# Patient Record
(demographics unavailable — no encounter records)

---

## 2025-02-07 NOTE — DISCUSSION/SUMMARY
[de-identified] : The patient presents today for initial consultation evaluation regarding right shoulder pain ongoing for about 2 weeks or so now.  His physical exam and x-rays reveals evidence of osteoarthritis about the right shoulder.  I am concerned that he has rotator cuff tear as well due to significant weakness and limitation of active motion.  I recommend an MRI to rule out rotator cuff tear and to evaluate the extent of the osteoarthritis.  He will likely require shoulder placement surgery in the future.  I will see him back after the MRI for follow-up and review.  In addition we started him a course of diclofenac 75 mg twice a day for the next 2 weeks.  At least 30 minutes was spent performing the evaluation and management on today's office visit.  This includes but is not limited to preparing to see patient including review of any test results or outside medical records, obtaining and/or reviewing separately obtained history, performing examination and evaluation, counseling and educating the patient on their diagnosis and treatment recommendations, ordering medications, tests, or procedures, documenting clinical information in the electronic health record, independently interpreting results (not separately reported) and communicating results to the patient, and coordination of care.

## 2025-02-07 NOTE — HISTORY OF PRESENT ILLNESS
[de-identified] : This patient presents today for evaluation regarding right shoulder pain.  Patient denies any injury.  Patient said pain about 2 weeks now.  Pain level 8 out of 10.  Patient notes pain about the anterior shoulder which radiates down towards the elbow.  Pain worse with activity such as reaching pulling pushing and lifting.  Pain is improved with rest.  Denies radicular symptoms or numbness and tingling.  Takes Aleve for the pain.  Patient has a history of left shoulder reverse replacement in 2019.

## 2025-02-07 NOTE — PHYSICAL EXAM
[de-identified] : The patient appears well nourished  and in no apparent distress.  The patient is alert and oriented to person, place, and time.   Affect and mood appear normal.    The head is normocephalic and atraumatic.  The eyes reveal normal sclera and extra ocular muscles are intact.   The neck appears normal with no jugular venous distention or masses noted.   Skin shows normal turgor with no evidence of eczema or psoriasis.  No respiratory distress noted.  The patient ambulates with a normal gait.  The right shoulder has significant loss of active range of motion.  Significant weakness of the rotator cuff.  Pain noted with terminal motion.  Positive impingement sign and positive Parikh sign crepitations noted with motion.  There is no tenderness to palpation.   There is no soft tissue swelling.  There is no eccyhmosis.  There is no warmth or erythema.    There is no instabililty on exam to anterior drawer or load and shift.  No lymphadenopathy or edema is noted.  Pulses and capillary refill are normal.  There is no muscular atrophy.  Strength and sensation are intact distally.   [de-identified] : AP,outlet,  and glenoid and axillary views of the right shoulder were obtained.  There is an inferior humeral head osteophyte.  There no fracture, dislocation, or subluxation.

## 2025-04-01 NOTE — END OF VISIT
[Time Spent: ___ minutes] : I have spent [unfilled] minutes of time on the encounter which excludes teaching and separately reported services.
Go for blood tests as directed. Your doctor will do lab tests at regular visits to monitor the effects of this medicine. Please follow up with your doctor and keep your health care provider appointments.

## 2025-04-02 NOTE — HISTORY OF PRESENT ILLNESS
[Procedure: ___] : Procedure performed: [unfilled]  [Date of Surgery: ___] : Date of Surgery:   [unfilled] [Surgeon Name:   ___] : Surgeon Name: Dr. YNE [Pre-Op Weight ___] : Pre-op weight was [unfilled] lbs [de-identified] : 65-year-old man ~16 months s/p Laparoscopic Sleeve Gastrectomy and hiatal hernia repair presents for follow-up. 2 lb weight gain since last visit ~6 months ago. Saw GI for surveillance of Acosta's - GI Dr. Mahnaz Baeza, reports EGD Nov 2024 demonstrated increase in Acosta's and recommended to continue PPI. Patient eating 3 protein-rich meals/day, trying to drink more zero-calorie fluids/day, taking bariatric vitamins, and has been walking and doing strength training for exercise. No abdominal pain, reflux, nausea/vomiting, constipation nor diarrhea.

## 2025-04-02 NOTE — PHYSICAL EXAM
[Normal] : affect appropriate [de-identified] : soft, NT, ND, no evidence of umbilical hernia or diastasis, incisions well healed    [de-identified] : Equal chest rise, non-labored respirations, no audible wheezing. [de-identified] : SHAYLEE

## 2025-04-02 NOTE — ASSESSMENT
[FreeTextEntry1] : 65-year-old man ~16 months s/p Laparoscopic Sleeve Gastrectomy and hiatal hernia repair presents for follow-up. 2 lb weight gain since last visit ~6 months ago. Saw GI for surveillance of Acosta's - GI Dr. Mahnaz Baeza, reports EGD Nov 2024 demonstrated increase in Acosta's and recommended to continue PPI. Nutrition and exercise guidelines reviewed with the patient.   Last blood work 3/14/2024 Next blood work due now  Continue 3 protein-focused meals/day (aim for 60 g - 70 g protein/day) Encourage zero calorie fluid intake (aim for 64 oz/day); drink 2 cups of water for every cup of caffeinated beverage Continue bariatric vitamins Maintain cardio (aim for 8k-10k steps/day) and strength training 20-30 minutes, 2-3x/week Use step counter Weigh yourself 1x-2x/week Try the Calm antony or Soothing Pod antony for stress management Pt saw the Nutritionist, Coral Tan, today for 15minutes at today's visit  Follow-up with GI for routine surveillance of Acosta's EGD and pathology report to be obtained for review Continue Omeprazole 40 mg/morning Fasting bariatric labs ordered and to be completed now  Follow-up in 6 months or sooner if needed All questions answered   Call with any questions or concerns   Time before and after visit spent reviewing chart

## 2025-06-13 NOTE — DISCUSSION/SUMMARY
[de-identified] : The patient presents today for follow-up regarding right shoulder pain.  The physical exam x-rays and MRI reveals evidence of severe osteoarthritis about the right shoulder with full-thickness tears of the rotator cuff with retraction.  I discussed the diagnosis and treatment recommendations.  I recommended reverse total shoulder replacement.  The risks benefits and alternative treatment plans of the surgical procedure were explained to the patient. The patient had the opportunity to ask any questions which answered to their satisfaction. The patient will schedule surgery at their convenience.  Due to the severe pain we did call in a prescription for Dilaudid for the patient.  Instructions are given regarding taking the Dilaudid.  At least 30 minutes was spent performing the evaluation and management on today's office visit.  This includes but is not limited to preparing to see patient including review of any test results or outside medical records, obtaining and/or reviewing separately obtained history, performing examination and evaluation, counseling and educating the patient on their diagnosis and treatment recommendations, ordering medications, tests, or procedures, documenting clinical information in the electronic health record, independently interpreting results (not separately reported) and communicating results to the patient, and coordination of care.

## 2025-06-13 NOTE — HISTORY OF PRESENT ILLNESS
[de-identified] : This patient presents today for follow-up after review of the MRI results of the right shoulder.  He is noted to have worsening pain about the right shoulder.  Having problems lifting the arm away from his body to any significant degree.  Pain level significant 8 out of 10.  Patient notes clicking and catching as well.  Denies radicular symptoms or numbness and tingling.  Pain worse with activity and improved with rest and immobilization.

## 2025-06-13 NOTE — REASON FOR VISIT
[Follow-Up Visit] : a follow-up visit for [Shoulder Impingement] : shoulder impingement [FreeTextEntry2] : Primary osteoarthritis of right shoulder.

## 2025-06-13 NOTE — PHYSICAL EXAM
[de-identified] : The patient appears well nourished  and in no apparent distress.  The patient is alert and oriented to person, place, and time.   Affect and mood appear normal.    The head is normocephalic and atraumatic.  The eyes reveal normal sclera and extra ocular muscles are intact.   The neck appears normal with no jugular venous distention or masses noted.   Skin shows normal turgor with no evidence of eczema or psoriasis.  No respiratory distress noted.  The patient ambulates with a normal gait.  The right shoulder has significant loss of active range of motion.  Significant weakness of the rotator cuff.  Pain noted with terminal motion.  Positive impingement sign and positive Parikh sign crepitations noted with motion.  There is no tenderness to palpation.   There is no soft tissue swelling.  There is no eccyhmosis.  There is no warmth or erythema.    There is no instabililty on exam to anterior drawer or load and shift.  No lymphadenopathy or edema is noted.  Pulses and capillary refill are normal.  There is no muscular atrophy.  Strength and sensation are intact distally.   [de-identified] : MRI was personally reviewed.  There is a retracted rotator cuff tear.  There is also severe osteoarthritis of the glenohumeral joint.  The version is normal without significant subluxation of the humeral head.